# Patient Record
Sex: MALE | URBAN - METROPOLITAN AREA
[De-identification: names, ages, dates, MRNs, and addresses within clinical notes are randomized per-mention and may not be internally consistent; named-entity substitution may affect disease eponyms.]

---

## 2018-05-01 ENCOUNTER — OUTPATIENT (OUTPATIENT)
Dept: OUTPATIENT SERVICES | Facility: HOSPITAL | Age: 63
LOS: 1 days | End: 2018-05-01

## 2018-05-18 ENCOUNTER — EMERGENCY (EMERGENCY)
Facility: HOSPITAL | Age: 63
LOS: 1 days | Discharge: HOME | End: 2018-05-18
Attending: EMERGENCY MEDICINE
Payer: MEDICAID

## 2018-05-18 VITALS
TEMPERATURE: 98 F | DIASTOLIC BLOOD PRESSURE: 101 MMHG | HEART RATE: 59 BPM | WEIGHT: 151.9 LBS | HEIGHT: 62 IN | RESPIRATION RATE: 20 BRPM | SYSTOLIC BLOOD PRESSURE: 158 MMHG | OXYGEN SATURATION: 97 %

## 2018-05-18 PROCEDURE — 73030 X-RAY EXAM OF SHOULDER: CPT

## 2018-05-18 PROCEDURE — 99283 EMERGENCY DEPT VISIT LOW MDM: CPT | Mod: 25

## 2018-05-18 PROCEDURE — 99284 EMERGENCY DEPT VISIT MOD MDM: CPT

## 2018-05-18 PROCEDURE — 73030 X-RAY EXAM OF SHOULDER: CPT | Mod: 26,RT

## 2018-05-18 NOTE — ED PROVIDER NOTE - PROGRESS NOTE DETAILS
Jones: right shoulder ac arthropathy.  spoke with chau and will see at office this wed.  dx right shoulder pain.  motrin/tylenol.  see dr ritchie wed.  call [phone].  left ambulatory.  return precautions given.

## 2018-05-18 NOTE — ED PROVIDER NOTE - MEDICAL DECISION MAKING DETAILS
63 yr old male with no hx presents to ed c/o right shoulder pain x 3 ys.  pt works as an  and stirs.  constant repetitive motion (circular). no trauma, no injections, no fever.  worse with movement and improves with motrin.  no n/v, no chest pain, no sob, no fever.  unable to raise arm above horizon.    pt with right chronic shoulder pain- xr shoulder, f/u with ortho

## 2018-05-18 NOTE — ED PROVIDER NOTE - OBJECTIVE STATEMENT
63 yr old male with no hx presents to ed c/o right shoulder pain x 3 ys.  pt works as an  and stirs.  constant repetitive motion (circular). no trauma, no injections, no fever.  worse with movement and improves with motrin.  no n/v, no chest pain, no sob, no fever.  unable to raise arm above horizon.

## 2018-05-18 NOTE — ED PROVIDER NOTE - PHYSICAL EXAMINATION
right shoulder- right shoulder- ttp at humeral head, underlying skin changes, no deformity, unable to raise arm above horizon.

## 2018-05-22 DIAGNOSIS — R69 ILLNESS, UNSPECIFIED: ICD-10-CM

## 2018-10-01 ENCOUNTER — OUTPATIENT (OUTPATIENT)
Dept: OUTPATIENT SERVICES | Facility: HOSPITAL | Age: 63
LOS: 1 days | End: 2018-10-01
Payer: MEDICAID

## 2018-10-11 DIAGNOSIS — Z71.89 OTHER SPECIFIED COUNSELING: ICD-10-CM

## 2024-05-03 NOTE — ED PROVIDER NOTE - NS ED MD DISPO DISCHARGE CCDA
Prescription sent to preferred pharmacy per protocol.  
Patient/Caregiver provided printed discharge information.